# Patient Record
Sex: FEMALE | Race: OTHER | HISPANIC OR LATINO | Employment: UNEMPLOYED | URBAN - METROPOLITAN AREA
[De-identification: names, ages, dates, MRNs, and addresses within clinical notes are randomized per-mention and may not be internally consistent; named-entity substitution may affect disease eponyms.]

---

## 2017-10-02 ENCOUNTER — TRANSCRIBE ORDERS (OUTPATIENT)
Dept: ADMINISTRATIVE | Facility: HOSPITAL | Age: 42
End: 2017-10-02

## 2017-10-02 ENCOUNTER — ALLSCRIPTS OFFICE VISIT (OUTPATIENT)
Dept: OTHER | Facility: OTHER | Age: 42
End: 2017-10-02

## 2017-10-02 DIAGNOSIS — Z13.220 ENCOUNTER FOR SCREENING FOR LIPOID DISORDERS: ICD-10-CM

## 2017-10-02 DIAGNOSIS — Z12.31 ENCOUNTER FOR SCREENING MAMMOGRAM FOR MALIGNANT NEOPLASM OF BREAST: ICD-10-CM

## 2017-10-02 DIAGNOSIS — R10.32 LEFT LOWER QUADRANT PAIN: ICD-10-CM

## 2017-10-02 DIAGNOSIS — E66.9 OBESITY: ICD-10-CM

## 2017-10-02 DIAGNOSIS — I10 ESSENTIAL (PRIMARY) HYPERTENSION: ICD-10-CM

## 2017-10-02 DIAGNOSIS — E55.9 VITAMIN D DEFICIENCY: ICD-10-CM

## 2017-10-03 ENCOUNTER — TRANSCRIBE ORDERS (OUTPATIENT)
Dept: LAB | Facility: HOSPITAL | Age: 42
End: 2017-10-03

## 2017-10-03 ENCOUNTER — GENERIC CONVERSION - ENCOUNTER (OUTPATIENT)
Dept: OTHER | Facility: OTHER | Age: 42
End: 2017-10-03

## 2017-10-03 ENCOUNTER — APPOINTMENT (OUTPATIENT)
Dept: LAB | Facility: HOSPITAL | Age: 42
End: 2017-10-03
Payer: COMMERCIAL

## 2017-10-03 DIAGNOSIS — I10 ESSENTIAL (PRIMARY) HYPERTENSION: ICD-10-CM

## 2017-10-03 DIAGNOSIS — E66.9 OBESITY: ICD-10-CM

## 2017-10-03 DIAGNOSIS — Z13.220 ENCOUNTER FOR SCREENING FOR LIPOID DISORDERS: ICD-10-CM

## 2017-10-03 DIAGNOSIS — E55.9 VITAMIN D DEFICIENCY: ICD-10-CM

## 2017-10-03 LAB
25(OH)D3 SERPL-MCNC: 34.2 NG/ML (ref 30–100)
ALBUMIN SERPL BCP-MCNC: 3.4 G/DL (ref 3.5–5)
ALP SERPL-CCNC: 87 U/L (ref 46–116)
ALT SERPL W P-5'-P-CCNC: 21 U/L (ref 12–78)
ANION GAP SERPL CALCULATED.3IONS-SCNC: 7 MMOL/L (ref 4–13)
AST SERPL W P-5'-P-CCNC: 15 U/L (ref 5–45)
BILIRUB SERPL-MCNC: 0.31 MG/DL (ref 0.2–1)
BUN SERPL-MCNC: 11 MG/DL (ref 5–25)
CALCIUM SERPL-MCNC: 9.3 MG/DL (ref 8.3–10.1)
CHLORIDE SERPL-SCNC: 104 MMOL/L (ref 100–108)
CHOLEST SERPL-MCNC: 194 MG/DL (ref 50–200)
CO2 SERPL-SCNC: 29 MMOL/L (ref 21–32)
CREAT SERPL-MCNC: 0.61 MG/DL (ref 0.6–1.3)
CREAT UR-MCNC: 355 MG/DL
EST. AVERAGE GLUCOSE BLD GHB EST-MCNC: 123 MG/DL
GFR SERPL CREATININE-BSD FRML MDRD: 112 ML/MIN/1.73SQ M
GLUCOSE P FAST SERPL-MCNC: 107 MG/DL (ref 65–99)
HBA1C MFR BLD: 5.9 % (ref 4.2–6.3)
HDLC SERPL-MCNC: 49 MG/DL (ref 40–60)
LDLC SERPL CALC-MCNC: 123 MG/DL (ref 0–100)
MICROALBUMIN UR-MCNC: 14.8 MG/L (ref 0–20)
MICROALBUMIN/CREAT 24H UR: 4 MG/G CREATININE (ref 0–30)
POTASSIUM SERPL-SCNC: 3.5 MMOL/L (ref 3.5–5.3)
PROT SERPL-MCNC: 7.7 G/DL (ref 6.4–8.2)
SODIUM SERPL-SCNC: 140 MMOL/L (ref 136–145)
TRIGL SERPL-MCNC: 112 MG/DL

## 2017-10-03 PROCEDURE — 82043 UR ALBUMIN QUANTITATIVE: CPT

## 2017-10-03 PROCEDURE — 36415 COLL VENOUS BLD VENIPUNCTURE: CPT

## 2017-10-03 PROCEDURE — 83036 HEMOGLOBIN GLYCOSYLATED A1C: CPT

## 2017-10-03 PROCEDURE — 82306 VITAMIN D 25 HYDROXY: CPT

## 2017-10-03 PROCEDURE — 80053 COMPREHEN METABOLIC PANEL: CPT

## 2017-10-03 PROCEDURE — 82570 ASSAY OF URINE CREATININE: CPT

## 2017-10-03 PROCEDURE — 80061 LIPID PANEL: CPT

## 2017-10-03 NOTE — PROGRESS NOTES
Assessment  1  Essential hypertension (401 9) (I10)   2  Obesity (278 00) (E66 9)   3  Encounter for screening mammogram for malignant neoplasm of breast (V76 12)   (Z12 31)   4  Screening for lipoid disorders (V77 91) (Z13 220)   5  Vitamin D deficiency (268 9) (E55 9)   6  Colon cancer screening (V76 51) (Z12 11)    Plan  Colon cancer screening    · COLONOSCOPY; Status:Active; Requested for:2017;   Colon cancer screening, FamHx: Family history of colon cancer    · 1 - Dario VELAZQUEZ, Kishan Gray  (Gastroenterology) Co-Management  *mom and dad have colon  cancer  Status: Active  Requested for: 2017  Care Summary provided  : Yes  Encounter for screening mammogram for malignant neoplasm of breast    · * MAMMO SCREENING BILATERAL W CAD; Status:Hold For - Scheduling; Requested  for:2017;   Essential hypertension    · AmLODIPine Besylate 2 5 MG Oral Tablet; TAKE 1 TABLET DAILY AS DIRECTED   · Lisinopril-Hydrochlorothiazide 20-25 MG Oral Tablet; TAKE 1 TABLET DAILY  Essential hypertension, Obesity, Screening for lipoid disorders, Vitamin D deficiency    · (1) HEMOGLOBIN A1C; Status:Active; Requested GOJ:[id number];   Essential hypertension, Screening for lipoid disorders, Vitamin D deficiency    · (1) COMPREHENSIVE METABOLIC PANEL; Status:Active; Requested [id number];    · (1) LIPID PANEL, FASTING; Status:Active; Requested for:2017;    · (1) MICROALBUMIN CREATININE RATIO, RANDOM URINE; Status:Active; Requested  [id number];    · (1) VITAMIN D 25-HYDROXY; Status:Active; Requested for:2017;   PMH: History of hypokalemia    · Potassium Chloride ER 10 MEQ Oral Capsule Extended Release; TAKE 1  CAPSULE DAILY  PMH: Medial epicondylitis    · Meloxicam 7 5 MG Oral Tablet; TAKE 1 TABLET DAILY AS NEEDED    Discussion/Summary  Possible side effects of new medications were reviewed with the patient/guardian today  The treatment plan was reviewed with the patient/guardian   The patient/guardian understands and agrees with the treatment plan      Chief Complaint  PT here to get refills on her Medication   Patient is here today for follow up of chronic conditions described in HPI  History of Present Illness  The patient is being seen for follow-up of lumbar pain  The patient reports doing well  There are no comorbid illnesses  She has had no significant interval events  The patient is currently asymptomatic  Associated symptoms:  no incontinence-and-no urinary retention  Medications include nonsteroidal anti-inflammatory drugs  Medications:  the patient is adherent to her medication regimen, but-she denies medication side effects  Disease management:  the patient is doing well with her goals  The patient presents for follow-up of primary hypertension  The patient states she has been doing well with her blood pressure control since the last visit  She has no comorbid illnesses  She has no significant interval events  Symptoms: Associated symptoms include no headache,-no focal neurologic deficits-and-no memory loss  Blood pressure control has been good  Medications: the patient is adherent with her medication regimen -She denies medication side effects  Disease Management: the patient is doing well with her blood pressure goals  The patient is due for a lipid panel,-an eye exam,-a serum creatinine-and-a urine microalbumin  Review of Systems    Constitutional: No fever, no chills, feels well, no tiredness, no recent weight gain or weight loss  Eyes: No complaints of eye pain, no red eyes, no eyesight problems, no discharge, no dry eyes, no itching of eyes  ENT: no complaints of earache, no loss of hearing, no nose bleeds, no nasal discharge, no sore throat, no hoarseness  Cardiovascular: No complaints of slow heart rate, no fast heart rate, no chest pain, no palpitations, no leg claudication, no lower extremity edema     Respiratory: No complaints of shortness of breath, no wheezing, no cough, no SOB on exertion, no orthopnea, no PND  Integumentary: No complaints of skin rash or lesions, no itching, no skin wounds, no breast pain or lump  Neurological: No complaints of headache, no confusion, no convulsions, no numbness, no dizziness or fainting, no tingling, no limb weakness, no difficulty walking  Psychiatric: Not suicidal, no sleep disturbance, no anxiety or depression, no change in personality, no emotional problems  Endocrine: No complaints of proptosis, no hot flashes, no muscle weakness, no deepening of the voice, no feelings of weakness  Hematologic/Lymphatic: No complaints of swollen glands, no swollen glands in the neck, does not bleed easily, does not bruise easily  Active Problems  1  Encounter for gynecological examination without abnormal finding (V72 31) (Z01 419)   2  Encounter for screening mammogram for malignant neoplasm of breast (V76 12)   (Z12 31)   3  Essential hypertension (401 9) (I10)   4  Herniated nucleus pulposus, L4-5 (722 10) (M51 26)   5  Lumbar radiculopathy (724 4) (M54 16)   6  Need for prophylactic vaccination and inoculation against influenza (V04 81) (Z23)   7  Obesity (278 00) (E66 9)   8  Ovarian cyst, right (620 2) (N83 201)   9  Pigmented nevus (216 9) (D22 9)   10  Sacroiliitis (720 2) (M46 1)   11  Screening for lipoid disorders (V77 91) (Z13 220)   12  Tuberculosis screening (V74 1) (Z11 1)   13  Vitamin D deficiency (268 9) (E55 9)    Past Medical History  1  History of  6 (V22 2) (Z33 1)   2  History of hypertension (V12 59) (Z86 79)   3  History of migraine (V12 49) (Z86 69)   4  History of spontaneous  (V13 29) (Z87 59)   5  History of spontaneous  (V13 29) (Z87 59)   6  History of Menarche (V21 8)   7  History of Normal vaginal delivery (650) (O80)    The active problems and past medical history were reviewed and updated today  Surgical History  1  History of Appendectomy   2  History of Cholecystectomy   3   History of Diagnostic Cystoscopy   4  History of Endometrial Biopsy By Suction   5  History of Hysterectomy   6  History of Oral Surgery Tooth Extraction   7  History of Salpingectomy   8  History of Surgical Treatment Of Missed  In First Trimester    The surgical history was reviewed and updated today  Family History  Mother    1  Family history of Diabetes Mellitus (V18 0)   2  Family history of colon cancer (V16 0) (Z80 0)   3  Family history of Hypertension (V17 49)  Father    4  Family history of Colon Cancer (V16 0)   5  Family history of colon cancer (V16 0) (Z80 0)   6  Family history of depression (V17 0) (Z81 8)   7  Family history of hypertension (V17 49) (Z82 49)   8  Family history of Heart Disease (V17 49)   9  Family history of Prostate Cancer (V16 42)  Sister    8  Family history of cardiac disorder (V17 49) (Z82 49)   11  Family history of diabetes mellitus (V18 0) (Z83 3)   12  Family history of hypertension (V17 49) (Z82 49)  Maternal Grandmother    15  Family history of Diabetes Mellitus (V18 0)  Maternal Aunt    15  Family history of diabetes mellitus (V18 0) (Z83 3)  Paternal Aunt    13  Family history of malignant neoplasm of breast (V16 3) (Z80 3)  Family History    16  Family history of Epilepsy   17  Family history of Respiratory Disorder    The family history was reviewed and updated today  Social History   · Being A Social Drinker   · Birth Control Method - Partner Had Vasectomy   · Currently sexually active   · Daily Coffee Consumption (1  Cups/Day)   · Denied: History of Drug Use   · Lack of exercise (V69 0) (Z72 3)   · Never A Smoker  The social history was reviewed and updated today  The social history was reviewed and is unchanged  Current Meds   1  AmLODIPine Besylate 2 5 MG Oral Tablet; TAKE 1 TABLET DAILY AS DIRECTED; Therapy: 45OPF1348 to (Evaluate:46Gix9938)  Requested for: 45XSN2298; Last   Rx:17Gfo5850 Ordered   2  EQL Vitamin D3 1000 UNIT TABS;    Therapy: (XIQMJCFQ:73IKK4519) to Recorded   3  Lisinopril-Hydrochlorothiazide 20-25 MG Oral Tablet; TAKE 1 TABLET DAILY; Therapy: 39OXA7716 to (Evaluate:67Zta1823)  Requested for: 98DXJ9385; Last   Rx:47Hmf2477 Ordered   4  Meloxicam 7 5 MG Oral Tablet; TAKE 1 TABLET DAILY AS NEEDED; Therapy: 27Zqu2819 to (Lisa Finn)  Requested for: 94Ivo1016; Last   Rx:99Oxp7124 Ordered   5  Potassium Chloride ER 10 MEQ Oral Capsule Extended Release; TAKE 1 CAPSULE   DAILY; Therapy: 34OPR5344 to (Evaluate:01Qsp0800)  Requested for: 19Jho5824; Last   Rx:06Edx1119 Ordered    The medication list was reviewed and updated today  Allergies  1  No Known Drug Allergies  2  Seasonal    Vitals  Vital Signs    Recorded: 75MLI5449 09:06AM   Heart Rate 68   Respiration 16   Systolic 672   Diastolic 82   Weight 220 lb 2 oz   BMI Calculated 32 74   BSA Calculated 1 8     Physical Exam    Constitutional   General appearance: No acute distress, well appearing and well nourished  Eyes   Conjunctiva and lids: No swelling, erythema or discharge  Pupils and irises: Equal, round and reactive to light  Pulmonary   Respiratory effort: No increased work of breathing or signs of respiratory distress  Auscultation of lungs: Clear to auscultation  Cardiovascular   Palpation of heart: Normal PMI, no thrills  Auscultation of heart: Normal rate and rhythm, normal S1 and S2, without murmurs  Examination of extremities for edema and/or varicosities: Normal     Lymphatic   Palpation of lymph nodes in neck: No lymphadenopathy  Neurologic   Cranial nerves: Cranial nerves 2-12 intact  Reflexes: 2+ and symmetric  Sensation: No sensory loss      Psychiatric   Orientation to person, place, and time: Normal     Mood and affect: Normal          Results/Data  PHQ-2 Adult Depression Screening 07WWL1978 09:10AM User, s     Test Name Result Flag Reference   PHQ-2 Adult Depression Score 0     Over the last two weeks, how often have you been bothered by any of the following problems?   Little interest or pleasure in doing things: Not at all - 0  Feeling down, depressed, or hopeless: Not at all - 0   PHQ-2 Adult Depression Screening Negative         Signatures   Electronically signed by : Naty Hernandez MD; Oct  2 2017  9:27AM EST                       (Author)

## 2017-10-04 ENCOUNTER — ALLSCRIPTS OFFICE VISIT (OUTPATIENT)
Dept: OTHER | Facility: OTHER | Age: 42
End: 2017-10-04

## 2017-10-06 ENCOUNTER — GENERIC CONVERSION - ENCOUNTER (OUTPATIENT)
Dept: OTHER | Facility: OTHER | Age: 42
End: 2017-10-06

## 2017-10-06 ENCOUNTER — LAB REQUISITION (OUTPATIENT)
Dept: LAB | Facility: HOSPITAL | Age: 42
End: 2017-10-06
Payer: COMMERCIAL

## 2017-10-06 ENCOUNTER — HOSPITAL ENCOUNTER (OUTPATIENT)
Dept: MAMMOGRAPHY | Facility: HOSPITAL | Age: 42
Discharge: HOME | End: 2017-10-06
Payer: COMMERCIAL

## 2017-10-06 DIAGNOSIS — Z12.31 ENCOUNTER FOR SCREENING MAMMOGRAM FOR MALIGNANT NEOPLASM OF BREAST: ICD-10-CM

## 2017-10-06 DIAGNOSIS — R82.90 ABNORMAL FINDING IN URINE: ICD-10-CM

## 2017-10-06 PROCEDURE — 87186 SC STD MICRODIL/AGAR DIL: CPT | Performed by: NURSE PRACTITIONER

## 2017-10-06 PROCEDURE — G0202 SCR MAMMO BI INCL CAD: HCPCS

## 2017-10-06 PROCEDURE — 87086 URINE CULTURE/COLONY COUNT: CPT | Performed by: NURSE PRACTITIONER

## 2017-10-06 PROCEDURE — 87147 CULTURE TYPE IMMUNOLOGIC: CPT | Performed by: NURSE PRACTITIONER

## 2017-10-06 PROCEDURE — 87077 CULTURE AEROBIC IDENTIFY: CPT | Performed by: NURSE PRACTITIONER

## 2017-10-11 ENCOUNTER — GENERIC CONVERSION - ENCOUNTER (OUTPATIENT)
Dept: OTHER | Facility: OTHER | Age: 42
End: 2017-10-11

## 2017-10-11 ENCOUNTER — HOSPITAL ENCOUNTER (OUTPATIENT)
Dept: RADIOLOGY | Facility: HOSPITAL | Age: 42
Discharge: HOME | End: 2017-10-11
Payer: COMMERCIAL

## 2017-10-11 DIAGNOSIS — R10.32 LEFT LOWER QUADRANT PAIN: ICD-10-CM

## 2017-10-11 LAB — BACTERIA UR CULT: ABNORMAL

## 2017-10-11 PROCEDURE — 76830 TRANSVAGINAL US NON-OB: CPT

## 2017-10-11 PROCEDURE — 76856 US EXAM PELVIC COMPLETE: CPT

## 2017-10-13 ENCOUNTER — ANESTHESIA EVENT (OUTPATIENT)
Dept: PERIOP | Facility: AMBULARY SURGERY CENTER | Age: 42
End: 2017-10-13
Payer: COMMERCIAL

## 2017-10-16 ENCOUNTER — GENERIC CONVERSION - ENCOUNTER (OUTPATIENT)
Dept: OTHER | Facility: OTHER | Age: 42
End: 2017-10-16

## 2017-10-23 ENCOUNTER — ANESTHESIA (OUTPATIENT)
Dept: PERIOP | Facility: AMBULARY SURGERY CENTER | Age: 42
End: 2017-10-23
Payer: COMMERCIAL

## 2017-10-23 ENCOUNTER — GENERIC CONVERSION - ENCOUNTER (OUTPATIENT)
Dept: GASTROENTEROLOGY | Facility: CLINIC | Age: 42
End: 2017-10-23

## 2017-10-23 ENCOUNTER — HOSPITAL ENCOUNTER (OUTPATIENT)
Facility: AMBULARY SURGERY CENTER | Age: 42
Setting detail: OUTPATIENT SURGERY
Discharge: HOME | End: 2017-10-23
Attending: INTERNAL MEDICINE | Admitting: INTERNAL MEDICINE
Payer: COMMERCIAL

## 2017-10-23 VITALS
SYSTOLIC BLOOD PRESSURE: 112 MMHG | BODY MASS INDEX: 33.04 KG/M2 | OXYGEN SATURATION: 99 % | WEIGHT: 175 LBS | HEART RATE: 55 BPM | HEIGHT: 61 IN | DIASTOLIC BLOOD PRESSURE: 72 MMHG | TEMPERATURE: 97.1 F | RESPIRATION RATE: 18 BRPM

## 2017-10-23 RX ORDER — AMLODIPINE BESYLATE 2.5 MG/1
5 TABLET ORAL DAILY
COMMUNITY
End: 2018-10-09 | Stop reason: SDUPTHER

## 2017-10-23 RX ORDER — SODIUM CHLORIDE, SODIUM LACTATE, POTASSIUM CHLORIDE, CALCIUM CHLORIDE 600; 310; 30; 20 MG/100ML; MG/100ML; MG/100ML; MG/100ML
125 INJECTION, SOLUTION INTRAVENOUS CONTINUOUS
Status: DISCONTINUED | OUTPATIENT
Start: 2017-10-23 | End: 2017-10-23 | Stop reason: HOSPADM

## 2017-10-23 RX ORDER — POTASSIUM CHLORIDE 750 MG/1
10 TABLET, FILM COATED, EXTENDED RELEASE ORAL 2 TIMES DAILY
COMMUNITY

## 2017-10-23 RX ORDER — MELATONIN
1000 DAILY
COMMUNITY

## 2017-10-23 RX ORDER — PROPOFOL 10 MG/ML
INJECTION, EMULSION INTRAVENOUS AS NEEDED
Status: DISCONTINUED | OUTPATIENT
Start: 2017-10-23 | End: 2017-10-23 | Stop reason: SURG

## 2017-10-23 RX ORDER — LISINOPRIL AND HYDROCHLOROTHIAZIDE 25; 20 MG/1; MG/1
1 TABLET ORAL DAILY
COMMUNITY
End: 2018-10-09 | Stop reason: SDUPTHER

## 2017-10-23 RX ADMIN — PROPOFOL 50 MG: 10 INJECTION, EMULSION INTRAVENOUS at 08:54

## 2017-10-23 RX ADMIN — PROPOFOL 50 MG: 10 INJECTION, EMULSION INTRAVENOUS at 08:45

## 2017-10-23 RX ADMIN — PROPOFOL 100 MG: 10 INJECTION, EMULSION INTRAVENOUS at 08:42

## 2017-10-23 RX ADMIN — SODIUM CHLORIDE, POTASSIUM CHLORIDE, SODIUM LACTATE AND CALCIUM CHLORIDE 125 ML/HR: 600; 310; 30; 20 INJECTION, SOLUTION INTRAVENOUS at 07:54

## 2017-10-23 RX ADMIN — PROPOFOL 50 MG: 10 INJECTION, EMULSION INTRAVENOUS at 08:50

## 2017-10-23 RX ADMIN — PROPOFOL 20 MG: 10 INJECTION, EMULSION INTRAVENOUS at 08:58

## 2017-10-23 NOTE — OP NOTE
OPERATIVE REPORT  PATIENT NAME: Anthony Vora    :  1975  MRN: [id number]  Pt Location: [facility]  GI ROOM 01    SURGERY DATE: 10/23/2017    Surgeon(s) and Role:     * Anna Ley DO - Primary    Preop Diagnosis:  Encounter for screening for malignant neoplasm of colon [Z12 11]    Post-Op Diagnosis Codes:     * Encounter for screening for malignant neoplasm of colon [Z12 11]    Procedure(s) (LRB):  COLONOSCOPY (N/A)    Specimen(s):  * No specimens in log *    Estimated Blood Loss:   Minimal    Drains:       Anesthesia Type:   IV Sedation with Anesthesia    Operative Indications:  Encounter for screening for malignant neoplasm of colon [Z12 11]      Operative Findings:    Colonoscopy Procedure Note    Procedure: Colonoscopy    Sedation: Monitored anesthesia care, check anesthesia records      ASA Class: 2    INDICATIONS: family history of colon cancer    POST-OP DIAGNOSIS: See the impression below    Procedure Details     Informed consent was obtained for the procedure, including sedation  Risks of perforation, hemorrhage, adverse drug reaction and aspiration were discussed  The patient was placed in the left lateral decubitus position  Based on the pre-procedure assessment, including review of the patient's medical history, medications, allergies, and review of systems, she had been deemed to be an appropriate candidate for conscious sedation; she was therefore sedated with the medications listed below  The patient was monitored continuously with telemetry, pulse oximetry, blood pressure monitoring, and direct observations  A rectal examination was performed  The colonoscope was inserted into the rectum and advanced under direct vision to the cecum, which was identified by the ileocecal valve and appendiceal orifice  The quality of the colonic preparation was poor    A careful inspection was made as the colonoscope was withdrawn, including a retroflexed view of the rectum; findings and interventions are described below  Findings:  Large amount of liquid stool left in the colon  This was aspirated with inadequate views  Complications:  None; patient tolerated the procedure well  Impression:    Poor overall prep  Recommendations:  I would recommend repeating the colonoscopy in 3 years due to poor prep and family history of colon cancer          Alexa Liriano DO  DATE: October 23, 2017  TIME: 9:00 AM

## 2017-10-23 NOTE — ANESTHESIA PREPROCEDURE EVALUATION
Review of Systems/Medical History  Patient summary reviewed    History of anesthetic complications PONV    Cardiovascular  Hypertension ,    Pulmonary  Negative pulmonary ROS ,        GI/Hepatic            Endo/Other     GYN       Hematology   Musculoskeletal       Neurology   Psychology           Physical Exam    Airway    Mallampati score: II  TM Distance: >3 FB  Neck ROM: full     Dental       Cardiovascular      Pulmonary      Other Findings        Anesthesia Plan  ASA Score- 2       Anesthesia Type- IV sedation with anesthesia with ASA Monitors  Additional Monitors:   Airway Plan:           Induction- intravenous  Informed Consent- Anesthetic plan and risks discussed with patient  I personally reviewed this patient with the CRNA  Discussed and agreed on the Anesthesia Plan with the CRNA  Rae Urrutia

## 2017-10-23 NOTE — ANESTHESIA POSTPROCEDURE EVALUATION
Post-Op Assessment Note      CV Status:  Stable    Mental Status:  Alert and awake    Hydration Status:  Euvolemic    PONV Controlled:  Controlled    Airway Patency:  Patent    Post Op Vitals Reviewed: Yes          Staff: CRNA           /71 (10/23/17 0900)    Temp     Pulse 68 (10/23/17 0900)   Resp 14 (10/23/17 0900)    SpO2 98 % (10/23/17 0900)

## 2017-10-27 NOTE — CONSULTS
Assessment  1  Family history of colon cancer (V16 0) (Z80 0) : Father, Mother, Brother   2  High risk for colon cancer (V49 89) (Z91 89)    Plan  Colon cancer screening    · Prepopik 10-3 5-12 MG-GM-GM Oral Packet; DILUTE CONTENTS OF PACKET AND  USE AS DIRECTED FOR BOWEL PREP   Rx By: Jose Mcclain; Dispense: 1 Days ; #:1 X 2 Packet Box; Refill: 0;For: Colon cancer screening; WARD = N; Verified Transmission to [id number] Last Updated By: System, [employer]; 10/4/2017 8:13:14 AM   · Follow-up PRN Evaluation and Treatment  Follow-up  Status: Complete  Done:  02AOB4487   Ordered; For: Colon cancer screening; Ordered By: Jose Mcclain Performed:  Due: 85RWU2258   · COLONOSCOPY (GI, SURG); Status:Active; Requested [id number];    Perform:[facility]; SQZ:[id number]; Last Updated Richy Alejo; 10/4/2017 8:30:23 AM;Ordered; For:Colon cancer screening; Ordered By:Andrae Tillman; Discussion/Summary  Discussion Summary:   43year old female with strong family history of Colon cancer  her genetic counseling but pt refused  do colonoscopy due to increased risk of colon cancer and then I would recommend she get this every 5 years  Chief Complaint  Chief Complaint Free Text Note Form: pt consult for colon cancer screening, referred by Dr Nargis Gage      History of Present Illness  HPI: 43year old female referred by Dr Steff Lin due to strong family history of cancer    had colon and prostate cancer diagnosed in his late 42's and  at age 49 was recently diagnosed with colon cancer in her late 59's had colon cancer diagnosed in his 52's and  at age 72 unrelated to the colon cancer  Reports dad was a smoker  colonoscopy in her past in her 29's which is reported to me as normal    denies any hematochezia  Review of Systems  Complete-Female GI Adult:   Constitutional: No fever, no chills, feels well, no tiredness, no recent weight gain or weight loss     Eyes: No complaints of eye pain, no red eyes, no eyesight problems, no discharge, no dry eyes, no itching of eyes  ENT: no complaints of earache, no loss of hearing, no nose bleeds, no nasal discharge, no sore throat, no hoarseness  Cardiovascular: No complaints of slow heart rate, no fast heart rate, no chest pain, no palpitations, no leg claudication, no lower extremity edema  Respiratory: No complaints of shortness of breath, no wheezing, no cough, no SOB on exertion, no orthopnea, no PND  Gastrointestinal: no abdominal pain,-- no nausea,-- no vomiting,-- no constipation,-- no diarrhea-- and-- no blood in stools  Genitourinary: No complaints of dysuria, no incontinence, no pelvic pain, no dysmenorrhea, no vaginal discharge or bleeding  Musculoskeletal: No complaints of arthralgias, no myalgias, no joint swelling or stiffness, no limb pain or swelling  Integumentary: No complaints of skin rash or lesions, no itching, no skin wounds, no breast pain or lump  Neurological: No complaints of headache, no confusion, no convulsions, no numbness, no dizziness or fainting, no tingling, no limb weakness, no difficulty walking  Psychiatric: Not suicidal, no sleep disturbance, no anxiety or depression, no change in personality, no emotional problems  Endocrine: No complaints of proptosis, no hot flashes, no muscle weakness, no deepening of the voice, no feelings of weakness  Hematologic/Lymphatic: No complaints of swollen glands, no swollen glands in the neck, does not bleed easily, does not bruise easily  ROS Reviewed:   ROS reviewed  Active Problems  1  Colon cancer screening (V76 51) (Z12 11)   2  Essential hypertension (401 9) (I10)   3  Herniated nucleus pulposus, L4-5 (722 10) (M51 26)   4  Lumbar radiculopathy (724 4) (M54 16)   5  Need for prophylactic vaccination and inoculation against influenza (V04 81) (Z23)   6  Obesity (278 00) (E66 9)   7  Ovarian cyst, right (620 2) (N83 201)   8   Pigmented nevus (216 9) (D22 9)   9  Sacroiliitis (720 2) (M46 1)   10  Vitamin D deficiency (268 9) (E55 9)    Past Medical History  1  History of Encounter for gynecological examination without abnormal finding (V72 31)   (Z01 419)   2  History of  6 (V22 2) (Z33 1)   3  History of hypertension (V12 59) (Z86 79)   4  History of migraine (V12 49) (Z86 69)   5  History of screening mammography (V15 89) (Z92 89)   6  History of spontaneous  (V13 29) (Z87 59)   7  History of spontaneous  (V13 29) (Z87 59)   8  History of Menarche (V21 8)   9  History of Normal vaginal delivery (650) (O80)   10  History of Screening for lipoid disorders (V77 91) (Z13 220)   11  History of Tuberculosis screening (V74 1) (Z11 1)  Active Problems And Past Medical History Reviewed: The active problems and past medical history were reviewed and updated today  Surgical History  1  History of Appendectomy   2  History of Cholecystectomy   3  History of Diagnostic Cystoscopy   4  History of Endometrial Biopsy By Suction   5  History of Hysterectomy   6  History of Oral Surgery Tooth Extraction   7  History of Salpingectomy   8  History of Surgical Treatment Of Missed  In First Trimester  Surgical History Reviewed: The surgical history was reviewed and updated today  Family History  Mother    1  Family history of Diabetes Mellitus (V18 0)   2  Family history of colon cancer (V16 0) (Z80 0)   3  Family history of Hypertension (V17 49)  Father    4  Family history of Colon Cancer (V16 0)   5  Family history of colon cancer (V16 0) (Z80 0)   6  Family history of depression (V17 0) (Z81 8)   7  Family history of hypertension (V17 49) (Z82 49)   8  Family history of Heart Disease (V17 49)   9  Family history of Prostate Cancer (V16 42)  Sister    8  Family history of cardiac disorder (V17 49) (Z82 49)   11  Family history of diabetes mellitus (V18 0) (Z83 3)   12   Family history of hypertension (V17 49) (Z82 49)  Brother 15  Family history of colon cancer (V16 0) (Z80 0)  Maternal Grandmother    15  Family history of Diabetes Mellitus (V18 0)  Maternal Aunt    13  Family history of diabetes mellitus (V18 0) (Z83 3)  Paternal Aunt    12  Family history of malignant neoplasm of breast (V16 3) (Z80 3)  Family History    17  Family history of Epilepsy   18  Family history of Respiratory Disorder  Family History Reviewed: The family history was reviewed and updated today  Social History   · Being A Social Drinker   · Birth Control Method - Partner Had Vasectomy   · Currently sexually active   · Daily Coffee Consumption (1  Cups/Day)   · Denied: History of Drug Use   · Lack of exercise (V69 0) (Z72 3)   · Never A Smoker  Social History Reviewed: The social history was reviewed and updated today  Current Meds   1  AmLODIPine Besylate 2 5 MG Oral Tablet; TAKE 1 TABLET DAILY AS DIRECTED; Therapy: 45VSF8309 to (Evaluate:60Ghk6814)  Requested for: 79AIT3614; Last   Rx:02Oct2017 Ordered   2  EQL Vitamin D3 1000 UNIT TABS; Therapy: (Recorded:24Oct2013) to Recorded   3  Lisinopril-Hydrochlorothiazide 20-25 MG Oral Tablet; TAKE 1 TABLET DAILY; Therapy: 64CTL6655 to (Evaluate:67Zry3443)  Requested for: 02Oct2017; Last   Rx:02Oct2017 Ordered   4  Meloxicam 7 5 MG Oral Tablet; TAKE 1 TABLET DAILY AS NEEDED; Therapy: 68Ddg3988 to (Evaluate:01Nov2017)  Requested for: 02Oct2017; Last   Rx:38Vfy1985 Ordered   5  Potassium Chloride ER 10 MEQ Oral Capsule Extended Release; TAKE 1 CAPSULE   DAILY; Therapy: 58BHW7132 to (Angélica Brooke)  Requested for: 30MLD7090; Last   Rx:02Oct2017 Ordered  Medication List Reviewed: The medication list was reviewed and updated today  Allergies  1  No Known Drug Allergies  2   Seasonal    Vitals  Vital Signs    Recorded: 04JFY5926 07:43AM   Temperature 97 7 F, Oral   Heart Rate 66   Systolic 603, LUE, Sitting   Diastolic 72, LUE, Sitting   Height 5 ft 1 5 in   Weight 177 lb 4 0 oz   BMI Calculated 32 95   BSA Calculated 1 81   O2 Saturation 97, RA     Physical Exam    Constitutional   General appearance: No acute distress, well appearing and well nourished  Eyes   Conjunctiva and lids: No swelling, erythema or discharge  Pupils and irises: Equal, round and reactive to light  Ears, Nose, Mouth, and Throat   External inspection of ears and nose: Normal     Nasal mucosa, septum, and turbinates: Normal without edema or erythema  Oropharynx: Normal with no erythema, edema, exudate or lesions  Pulmonary   Respiratory effort: No increased work of breathing or signs of respiratory distress  Auscultation of lungs: Clear to auscultation  Cardiovascular   Auscultation of heart: Normal rate and rhythm, normal S1 and S2, without murmurs  Examination of extremities for edema and/or varicosities: Normal     Abdomen   Abdomen: Non-tender, no masses  Liver and spleen: No hepatomegaly or splenomegaly  Lymphatic   Palpation of lymph nodes in neck: No lymphadenopathy  Musculoskeletal   Gait and station: Normal     Digits and nails: Normal without clubbing or cyanosis  Inspection/palpation of joints, bones, and muscles: Normal     Skin   Skin and subcutaneous tissue: Normal without rashes or lesions      Psychiatric   Orientation to person, place, and time: Normal     Mood and affect: Normal          Future Appointments    Date/Time Provider Specialty Site   10/06/2017 09:40 AM JESSE Alcantar Obstetrics/Gynecology [facility]     Signatures   Electronically signed by : Anna Ley DO; Oct  4 2017  9:32AM EST                       (Author)

## 2017-11-27 DIAGNOSIS — N83.201 CYST OF RIGHT OVARY: ICD-10-CM

## 2018-01-11 NOTE — MISCELLANEOUS
Message  "[address]" from [place] [address] Radiology department called today  MA completed new ultrasound order for US Complete Transabdominal and Transvaginal in [employer] for Pt as per Doreen's verbal request via phone call  MA faxed new [address] requisition to Radiology, cancelled previous US complete order in [employer]  Active Problems    1  Chronic constipation (564 00) (K59 09)   2  Colon cancer screening (V76 51) (Z12 11)   3  Encounter for gynecological examination without abnormal finding (V72 31) (Z01 419)   4  Encounter for screening mammogram for malignant neoplasm of breast (V76 12)   (Z12 31)   5  Essential hypertension (401 9) (I10)   6  External hemorrhoid (455 3) (K64 4)   7  Herniated nucleus pulposus, L4-5 (722 10) (M51 26)   8  High risk for colon cancer (V49 89) (Z91 89)   9  Left lower quadrant pain (789 04) (R10 32)   10  Lumbar radiculopathy (724 4) (M54 16)   11  Malodorous urine (791 9) (R82 90)   12  Need for prophylactic vaccination and inoculation against influenza (V04 81) (Z23)   13  Obesity (278 00) (E66 9)   14  Ovarian cyst, right (620 2) (N83 201)   15  Pigmented nevus (216 9) (D22 9)   16  Sacroiliitis (720 2) (M46 1)   17  Vaginal burning (625 8) (N94 9)   18  Vitamin D deficiency (268 9) (E55 9)    Current Meds   1  AmLODIPine Besylate 2 5 MG Oral Tablet; TAKE 1 TABLET DAILY AS DIRECTED; Therapy: 61LNV7456 to (Evaluate:41Qfv2612)  Requested for: 78XPG9284; Last   Rx:02Oct2017 Ordered   2  EQL Vitamin D3 1000 UNIT TABS; Therapy: (Recorded:24Oct2013) to Recorded   3  Lisinopril-Hydrochlorothiazide 20-25 MG Oral Tablet; TAKE 1 TABLET DAILY; Therapy: 54QFT0731 to (Evaluate:31Etv7541)  Requested for: 02Oct2017; Last   Rx:02Oct2017 Ordered   4  Meloxicam 7 5 MG Oral Tablet; TAKE 1 TABLET DAILY AS NEEDED; Therapy: 00Tae8743 to (Evaluate:01Nov2017)  Requested for: 02Oct2017; Last   Rx:02Oct2017 Ordered   5   Potassium Chloride ER 10 MEQ Oral Capsule Extended Release; TAKE 1 CAPSULE DAILY; Therapy: 01AZE6916 to (Abhijit Sorenson)  Requested for: 02Oct2017; Last   Rx:02Oct2017 Ordered   6  Prepopik 10-3 5-12 MG-GM-GM Oral Packet; DILUTE CONTENTS OF PACKET AND USE   AS DIRECTED FOR BOWEL PREP; Therapy: 51LQY3329 to [phone] 16 653)  Requested for: 35JXM8454; Last   Rx:04Oct2017 Ordered    Allergies    1  No Known Drug Allergies    2  Seasonal    Plan  Left lower quadrant pain    · * US PELVIS COMPLETE (TRANSABDOMINAL AND TRANSVAGINAL); Status:Hold For -  Scheduling,Retrospective By Protocol Authorization;  Requested for:11Oct2017;     Signatures   Electronically signed by : Na Bhakta MA; Oct 11 2017 11:37AM EST                       (Author)

## 2018-01-11 NOTE — MISCELLANEOUS
Message   Recorded as Task   Date: 10/11/2017 05:35 PM, Created By: Yesi Waldron   Task Name: Result Follow Up   Assigned To: Obi Hoffman   Regarding Patient: Lois Cuenca, Status: In Progress   Pedrosamantha Baez - 11 Oct 2017 5:35 PM     TASK CREATED  low counts of bacteria on urine culture   the patient reported c/o malodorous urine   susceptible to macrobid - I would advise a course of this and will eRx   please recommend pushing fluids and completing the entire rx     Thanks   Melany Chavira - 11 Oct 2017 5:40 PM     TASK IN PROGRESS   Melany Chavira - 11 Oct 2017 5:42 PM     TASK EDITED  [facility] for pt to cb for results       ext: Jeanette Angela - 11 Oct 2017 6:53 PM     TASK EDITED  MA spoke with Pt today via phone call  MA informed Pt that Pt's recent urine culture was positive for UTI  MA further informed Pt that JESSE Alva prescribed Macrobid 1 capsule twice daily PO for 7 days 0 refills, prescription forwarded to pharmacy in Pt's EHR  MA instructed Pt to drink plenty of liquids  and complete entire antibiotic medication regimen as per [facility] directive  Pt verbalized understanding of MA's instructions  MA reiterated to Pt that if her symptoms worsen and/ she has any questions/concerns, to contact office  Active Problems    1  Bacteriuria (791 9) (R82 71)   2  Chronic constipation (564 00) (K59 09)   3  Colon cancer screening (V76 51) (Z12 11)   4  Encounter for gynecological examination without abnormal finding (V72 31) (Z01 419)   5  Encounter for screening mammogram for malignant neoplasm of breast (V76 12)   (Z12 31)   6  Essential hypertension (401 9) (I10)   7  External hemorrhoid (455 3) (K64 4)   8  Herniated nucleus pulposus, L4-5 (722 10) (M51 26)   9  High risk for colon cancer (V49 89) (Z91 89)   10  Left lower quadrant pain (789 04) (R10 32)   11  Lumbar radiculopathy (724 4) (M54 16)   12  Malodorous urine (791 9) (R82 90)   13   Need for prophylactic vaccination and inoculation against influenza (V04 81) (Z23)   14  Obesity (278 00) (E66 9)   15  Ovarian cyst, right (620 2) (N83 201)   16  Pigmented nevus (216 9) (D22 9)   17  Sacroiliitis (720 2) (M46 1)   18  Vaginal burning (625 8) (N94 9)   19  Vitamin D deficiency (268 9) (E55 9)    Current Meds   1  AmLODIPine Besylate 2 5 MG Oral Tablet; TAKE 1 TABLET DAILY AS DIRECTED; Therapy: 25AJV2120 to (Evaluate:59Wkw6227)  Requested for: 63PVM7806; Last   Rx:02Oct2017 Ordered   2  EQL Vitamin D3 1000 UNIT TABS; Therapy: (Recorded:07Ohg7422) to Recorded   3  Lisinopril-Hydrochlorothiazide 20-25 MG Oral Tablet; TAKE 1 TABLET DAILY; Therapy: 09BXU9025 to (Evaluate:60Nex6025)  Requested for: 02Oct2017; Last   Rx:77Cqk5145 Ordered   4  Meloxicam 7 5 MG Oral Tablet; TAKE 1 TABLET DAILY AS NEEDED; Therapy: 17Xwn9132 to (Evaluate:01Nov2017)  Requested for: 02Oct2017; Last   Rx:58Dey8867 Ordered   5  Nitrofurantoin Monohyd Macro 100 MG Oral Capsule (Macrobid); Take 1 capsule twice   daily; Therapy: 43KYE8085 to (Evaluate:18Oct2017)  Requested for: 35ATU8785; Last   Rx:86Unj0060 Ordered   6  Potassium Chloride ER 10 MEQ Oral Capsule Extended Release; TAKE 1 CAPSULE   DAILY; Therapy: 32YAM1502 to (Jarome Duel)  Requested for: 02Oct2017; Last   Rx:02Oct2017 Ordered   7  Prepopik 10-3 5-12 MG-GM-GM Oral Packet; DILUTE CONTENTS OF PACKET AND USE   AS DIRECTED FOR BOWEL PREP; Therapy: 03XCC1165 to 21 )  Requested for: 69XCD1238; Last   Rx:04Oct2017 Ordered    Allergies    1  No Known Drug Allergies    2   Seasonal    Signatures   Electronically signed by : Haydee Johnston MA; Oct 11 2017  6:53PM EST                       (Author)

## 2018-01-12 VITALS
WEIGHT: 177.25 LBS | BODY MASS INDEX: 32.62 KG/M2 | OXYGEN SATURATION: 97 % | HEIGHT: 62 IN | TEMPERATURE: 97.7 F | HEART RATE: 66 BPM | SYSTOLIC BLOOD PRESSURE: 124 MMHG | DIASTOLIC BLOOD PRESSURE: 72 MMHG

## 2018-01-12 NOTE — RESULT NOTES
Verified Results  (1) LIPID PANEL, FASTING 97Cbh0783 08:47AM Diann Viramontes Marker Order Number: [id number]     Test Name Result Flag Reference   CHOLESTEROL 170 mg/dL     HDL,DIRECT 38 mg/dL L 40-60   Specimen collection should occur prior to Metamizole administration due to the potential for falsely depressed results  LDL CHOLESTEROL CALCULATED 107 mg/dL H 0-100   Triglyceride:         Normal              <150 mg/dl       Borderline High    150-199 mg/dl       High               200-499 mg/dl       Very High          >499 mg/dl  Cholesterol:         Desirable        <200 mg/dl      Borderline High  200-239 mg/dl      High             >239 mg/dl  HDL Cholesterol:        High    >59 mg/dL      Low     <41 mg/dL  LDL CALCULATED:    This screening LDL is a calculated result  It does not have the accuracy of the Direct Measured LDL in the monitoring of patients with hyperlipidemia and/or statin therapy  Direct Measure LDL (EKG816) must be ordered separately in these patients  TRIGLYCERIDES 126 mg/dL  <=150   Specimen collection should occur prior to N-Acetylcysteine or Metamizole administration due to the potential for falsely depressed results  (1) COMPREHENSIVE METABOLIC PANEL 50QCL4700 15:93MX Diann Viramontes Marker Order Number: [id number]     Test Name Result Flag Reference   GLUCOSE,RANDM 105 mg/dL     If the patient is fasting, the ADA then defines impaired fasting glucose as > 100 mg/dL and diabetes as > or equal to 123 mg/dL     SODIUM 139 mmol/L  136-145   POTASSIUM 3 4 mmol/L L 3 5-5 3   CHLORIDE 106 mmol/L  100-108   CARBON DIOXIDE 27 mmol/L  21-32   ANION GAP (CALC) 6 mmol/L  4-13   BLOOD UREA NITROGEN 14 mg/dL  5-25   CREATININE 0 69 mg/dL  0 60-1 30   Standardized to IDMS reference method   CALCIUM 8 5 mg/dL  8 3-10 1   BILI, TOTAL 0 35 mg/dL  0 20-1 00   ALK PHOSPHATAS 74 U/L     ALT (SGPT) 19 U/L  12-78   AST(SGOT) 12 U/L  5-45   ALBUMIN 3 4 g/dL L 3 5-5 0 TOTAL PROTEIN 7 3 g/dL  6 4-8 2   eGFR Non-African American      >60 0 ml/min/1 73sq m   [place] [facility] Disease Education Program recommendations are as follows:  GFR calculation is accurate only with a steady state creatinine  Chronic Kidney disease less than 60 ml/min/1 73 sq  meters  Kidney failure less than 15 ml/min/1 73 sq  meters  (1) MICROALBUMIN CREATININE RATIO, RANDOM URINE 17Mbl9253 08:47AM Dena Viramontes Order Number: [id number]     Test Name Result Flag Reference   MICROALBUMIN/ CREAT R 14 mg/g creatinine  0-30   MICROALBUMIN,URINE 39 0 mg/L H 0 0-20 0   CREATININE URINE 272 0 mg/dL         Plan  Hypokalemia    · Potassium Chloride ER 10 MEQ Oral Capsule Extended Release; TAKE 1  CAPSULE DAILY    Discussion/Summary   low good cholesterol HDL   eat more fibers in your diet and increase physical activity   slightly low potassium   will send potassium pills to the pharmacy        Signatures   Electronically signed by : Randy Riggins MD; Jul 29 2016 10:48AM EST                       (Author)

## 2018-01-12 NOTE — RESULT NOTES
Discussion/Summary   Normal Mammogram   repeat in 1 year   - Dr Ellie Nowak 83PVS3861 08:06AM Frantz Viramontes Samples Order Number: [id number]    - Patient Instructions: To schedule this appointment, please contact Central Scheduling at [phone]  Do not wear any perfume, powder, lotion or deodorant on breast or underarm area  Please bring your doctors order, referral (if needed) and insurance information with you on the day of the test  Failure to bring this information may result in this test being rescheduled  Arrive 15 minutes prior to your appointment time to register  On the day of your test, please bring any prior mammogram or breast studies with you that were not performed at a [facility]  Failure to bring prior exams may result in your test needing to be rescheduled  Test Name Result Flag Reference   MAMMO SCREENING BILATERAL W CAD (Report)     Patient History:   Family history of breast cancer in paternal aunt, unknown cancer    in paternal aunt, prostate cancer at age 50 and colorectal cancer   at age 54 in father, prostate cancer at age 50 in brother,    premenopausal endometrial cancer at age 21, premenopausal ovarian   cancer at age 21, and premenopausal breast cancer at age 28 in    paternal cousin, colorectal cancer at age 79 in mother  Patient has never smoked  Patient's BMI is 32 5  Reason for exam: screening, asymptomatic  Mammo Screening Bilateral W CAD: October 6, 2017 - Check In #:    [de-identified]   Bilateral CC and MLO view(s) were taken  Technologist: MICHAEL Guzman (R)(M)   Prior study comparison: August 2, 2016, mammo screening bilateral   W CAD performed at [facility]  September 27, 2010, bilateral WB digitl bilat cynthia, performed at    [address] [place] [address]  The breast tissue is almost entirely fat       No dominant soft tissue mass or suspicious calcifications are    noted  The skin and nipple contours are within normal limits  No mammographic evidence of malignancy  No significant changes when compared with prior studies  ACR BI-RADSï¾® Assessments: BiRad:1 - Negative     Recommendation:   Routine screening mammogram of both breasts in 1 year  Analyzed by CAD     The patient is scheduled in a reminder system for screening    mammography  8-10% of cancers will be missed on mammography  Management of a    palpable abnormality must be based on clinical grounds  Patients   will be notified of their results via letter from our facility  Accredited by [employer] of Radiology and FDA  Transcription Location: MICHAEL Kraft 98: [id number]     Risk Value(s):   Tyrer-Cuzick 10 Year: 1 800%, Tyrer-Cuzick Lifetime: 12 000%,    Myriad Table: 7 2%, DEBRA 5 Year: 0 5%, NCI Lifetime: 7 9%, MRS    : Based on personal and/or family history,    consideration of hereditary risk assessment may be warranted     Signed by:   Kasia Claros MD   10/6/17       Signatures   Electronically signed by : Singh Ardon MD; Oct  6 2017 12:51PM EST                       (Author)

## 2018-01-13 NOTE — MISCELLANEOUS
3  Lisinopril-Hydrochlorothiazide 20-25 MG Oral Tablet; TAKE 1 TABLET DAILY; Therapy: 73YKF2848 to (Evaluate:88Zfx6501)  Requested for: 02Oct2017; Last   Rx:02Oct2017 Ordered   4  Meloxicam 7 5 MG Oral Tablet; TAKE 1 TABLET DAILY AS NEEDED; Therapy: 32Cpx7346 to (Evaluate:01Nov2017)  Requested for: 02Oct2017; Last   Rx:02Oct2017 Ordered   5  Nitrofurantoin Monohyd Macro 100 MG Oral Capsule (Macrobid); Take 1 capsule twice   daily; Therapy: 94AWY5061 to (Evaluate:18Oct2017)  Requested for: 14OQR5102; Last   Rx:11Oct2017 Ordered   6  Potassium Chloride ER 10 MEQ Oral Capsule Extended Release; TAKE 1 CAPSULE   DAILY; Therapy: 30XRB0782 to (Loree Keene)  Requested for: 02Oct2017; Last   Rx:02Oct2017 Ordered   7  Prepopik 10-3 5-12 MG-GM-GM Oral Packet; DILUTE CONTENTS OF PACKET AND USE   AS DIRECTED FOR BOWEL PREP; Therapy: 29YJP4228 to 21 )  Requested for: 52WPK0737; Last   Rx:04Oct2017 Ordered    Allergies    1  No Known Drug Allergies    2  Seasonal    Plan  Ovarian cyst, right    · * US PELVIS COMPLETE (TRANSABDOMINAL AND TRANSVAGINAL); Status:Hold For -  Scheduling,Retrospective By Protocol Authorization;  Requested for:27Nov2017;     Signatures   Electronically signed by : Parish Whittington, ; Oct 16 2017  3:20PM EST                       (Author)

## 2018-01-14 VITALS
WEIGHT: 176.13 LBS | SYSTOLIC BLOOD PRESSURE: 112 MMHG | RESPIRATION RATE: 16 BRPM | BODY MASS INDEX: 33 KG/M2 | DIASTOLIC BLOOD PRESSURE: 82 MMHG | HEART RATE: 68 BPM

## 2018-01-16 NOTE — RESULT NOTES
Discussion/Summary   diabetes test was negative for diabetes   normal kidney and liver function    bad cholesterol LDL is slightly high    watch sweets and fatty food   - Dr Jackelyn Perez      Verified Results  (1) VITAMIN D 25-HYDROXY 03Oct2017 07:38AM Susanne Viramontes Order Number: [id number]     Test Name Result Flag Reference   VIT D 25-HYDROX 34 2 ng/mL  30 0-100 0   This assay is a certified procedure of the CDC Vitamin D Standardization Certification Program (VDSCP)     Deficiency <20ng/ml   Insufficiency 20-30ng/ml   Sufficient  ng/ml     *Patients undergoing fluorescein dye angiography may retain small amounts of fluorescein in the body for 48-72 hours post procedure  Samples containing fluorescein can produce falsely elevated Vitamin D values  If the patient had this procedure, a specimen should be resubmitted post fluorescein clearance  (1) COMPREHENSIVE METABOLIC PANEL 58REM0030 01:29GO Kishor Viramontes Order Number: [id number]     Test Name Result Flag Reference   SODIUM 140 mmol/L  136-145   POTASSIUM 3 5 mmol/L  3 5-5 3   CHLORIDE 104 mmol/L  100-108   CARBON DIOXIDE 29 mmol/L  21-32   ANION GAP (CALC) 7 mmol/L  4-13   BLOOD UREA NITROGEN 11 mg/dL  5-25   CREATININE 0 61 mg/dL  0 60-1 30   Standardized to IDMS reference method   CALCIUM 9 3 mg/dL  8 3-10 1   BILI, TOTAL 0 31 mg/dL  0 20-1 00   ALK PHOSPHATAS 87 U/L     ALT (SGPT) 21 U/L  12-78   Specimen collection should occur prior to Sulfasalazine and/or Sulfapyridine administration due to the potential for falsely depressed results  AST(SGOT) 15 U/L  5-45   Specimen collection should occur prior to Sulfasalazine administration due to the potential for falsely depressed results     ALBUMIN 3 4 g/dL L 3 5-5 0   TOTAL PROTEIN 7 7 g/dL  6 4-8 2   eGFR 112 ml/min/1 73sq m     National Kidney Disease Education Program recommendations are as follows:  GFR calculation is accurate only with a steady state creatinine  Chronic Kidney disease less than 60 ml/min/1 73 sq  meters  Kidney failure less than 15 ml/min/1 73 sq  meters  GLUCOSE FASTING 107 mg/dL H 65-99   Specimen collection should occur prior to Sulfasalazine administration due to the potential for falsely depressed results  Specimen collection should occur prior to Sulfapyridine administration due to the potential for falsely elevated results  (1) MICROALBUMIN CREATININE RATIO, RANDOM URINE 68GKK3715 07:38AM Aisha Viramontes Order Number: [id number]     Test Name Result Flag Reference   MICROALBUMIN/ CREAT R 4 mg/g creatinine  0-30   MICROALBUMIN,URINE 14 8 mg/L  0 0-20 0   CREATININE URINE 355 0 mg/dL       (1) LIPID PANEL, FASTING 44XIN8577 07:38AM Aisha Viramontes Order Number: [id number]     Test Name Result Flag Reference   CHOLESTEROL 194 mg/dL     HDL,DIRECT 49 mg/dL  40-60   Specimen collection should occur prior to Metamizole administration due to the potential for falsley depressed results  LDL CHOLESTEROL CALCULATED 123 mg/dL H 0-100   Triglyceride:        Normal <150 mg/dl   Borderline High 150-199 mg/dl   High 200-499 mg/dl   Very High >499 mg/dl      Cholesterol:       Desirable <200 mg/dl    Borderline High 200-239 mg/dl    High >239 mg/dl      HDL Cholesterol:       High>59 mg/dL    Low <41 mg/dL      This screening LDL is a calculated result  It does not have the accuracy of the Direct Measured LDL in the monitoring of patients with hyperlipidemia and/or statin therapy  Direct Measure LDL (KNW112) must be ordered separately in these patients  TRIGLYCERIDES 112 mg/dL  <=150   Specimen collection should occur prior to N-Acetylcysteine or Metamizole administration due to the potential for falsely depressed results  (1) HEMOGLOBIN A1C 03Oct2017 07:38AM Aisha Viramontes Order Number: [id number]     Test Name Result Flag Reference   HEMOGLOBIN A1C 5 9 %  4 2-6 3   EST  AVG   GLUCOSE 123 mg/dl         Signatures   Electronically signed by : Yovanny Sánchez MD; Oct  3 2017 11:22AM EST                       (Author)

## 2018-01-17 NOTE — RESULT NOTES
Verified Results  * XR ELBOW 3+ VIEW LEFT 63Rdu6531 11:55AM Saúl Viramontes Order Number: [id number]     Test Name Result Flag Reference   XR ELBOW 3+ VW LEFT (Report)     LEFT ELBOW     INDICATION: pt states LT anterior elbow pain x2 Mos with loss of strength     COMPARISON: None     VIEWS: 4; 4 images     FINDINGS:     There is no acute fracture or dislocation  There is no joint effusion  No degenerative changes  No lytic or blastic lesions are seen  Soft tissues are unremarkable  IMPRESSION:     No acute osseous abnormality  Workstation performed: [id number]     Signed by:   Georgina Costello MD   8/30/16       Discussion/Summary   NORMAL LEFT ELBOW  XRAY    - DR VIRAMONTES      Signatures   Electronically signed by : Radha De Anda MD; Aug 30 2016  2:14PM EST                       (Author)

## 2018-01-18 NOTE — PROGRESS NOTES
Assessment    1  Encounter for preventive health examination (V70 0) (Z00 00)   2  Essential hypertension (401 9) (I10)   3  Screening for lipoid disorders (V77 91) (Z13 220)   4  Obesity (278 00) (E66 9)   5  Hypokalemia (276 8) (E87 6)   6  Encounter for screening mammogram for malignant neoplasm of breast (V76 12)   (Z12 31)    Plan  Encounter for screening mammogram for malignant neoplasm of breast    · * MAMMO SCREENING BILATERAL W CAD; Status:Hold For - Scheduling; Requested  for:[id number];   Essential hypertension, Hypokalemia, Screening for lipoid disorders    · (1) COMPREHENSIVE METABOLIC PANEL; Status:Active; Requested for:93Pqq7610;    · (1) LIPID PANEL, FASTING; Status:Active; Requested for:[id number];    · (1) MICROALBUMIN CREATININE RATIO, RANDOM URINE; Status:Active; Requested  for:38Cfw7500;   Obesity    · Saxenda 18 MG/3ML Subcutaneous Solution Pen-injector; use as diirected by your  doctor   · Follow-up visit in 1 month Evaluation and Treatment  Follow-up  Status: Hold For -  Scheduling  Requested for: [id number]    Discussion/Summary  health maintenance visit Currently, she eats an adequate diet and has an inadequate exercise regimen  the risks and benefits of cervical cancer screening were discussed cervical cancer screening is current Breast cancer screening: the risks and benefits of breast cancer screening were discussed and mammogram has been ordered  Colorectal cancer screening: the risks and benefits of colorectal cancer screening were discussed and colorectal cancer screening is not indicated  Osteoporosis screening: the risks and benefits of osteoporosis screening were discussed and bone mineral density testing is not indicated  Screening lab work includes glucose and lipid profile  The immunizations are up to date   She was advised to be evaluated by an ophthalmologist and a dentist  Advice and education were given regarding nutrition, weight loss, calcium supplements, vitamin D supplements, reproductive health, contraception, sunscreen use, self skin examination, helmet use, seat belt use and fall risk reduction  Patient discussion: discussed with the patient, discussed with the patient's family  Chief Complaint  yearly physical      History of Present Illness  HM, Adult Female: The patient is being seen for a health maintenance evaluation  The last health maintenance visit was 2 year(s) ago  General Health: The patient's health since the last visit is described as good  She has regular dental visits  She complains of vision problems  Vision care includes wearing reading glasses and an eye examination within the last year  She denies hearing loss  Immunizations status: up to date  Lifestyle:  She does not have a healthy diet  She does not have any weight concerns  She does not exercise regularly  She does not use tobacco  She consumes alcohol  She reports occasional alcohol use  She denies drug use  Reproductive health:  she reports normal menses  she uses no contraception  she is sexually active  pregnancy history: G 7P 4, 4(miscarriages: 3 )  Screening: cancer screening reviewed and updated  metabolic screening reviewed and updated  risk screening reviewed and updated  Review of Systems    Constitutional: No fever, no chills, feels well, no tiredness, no recent weight gain or weight loss  Eyes: No complaints of eye pain, no red eyes, no eyesight problems, no discharge, no dry eyes, no itching of eyes  ENT: no complaints of earache, no loss of hearing, no nose bleeds, no nasal discharge, no sore throat, no hoarseness  Cardiovascular: No complaints of slow heart rate, no fast heart rate, no chest pain, no palpitations, no leg claudication, no lower extremity edema  Respiratory: No complaints of shortness of breath, no wheezing, no cough, no SOB on exertion, no orthopnea, no PND     Gastrointestinal: No complaints of abdominal pain, no constipation, no nausea or vomiting, no diarrhea, no bloody stools  Genitourinary: No complaints of dysuria, no incontinence, no pelvic pain, no dysmenorrhea, no vaginal discharge or bleeding  Musculoskeletal: No complaints of arthralgias, no myalgias, no joint swelling or stiffness, no limb pain or swelling  Integumentary: No complaints of skin rash or lesions, no itching, no skin wounds, no breast pain or lump  Neurological: No complaints of headache, no confusion, no convulsions, no numbness, no dizziness or fainting, no tingling, no limb weakness, no difficulty walking  Psychiatric: Not suicidal, no sleep disturbance, no anxiety or depression, no change in personality, no emotional problems  Endocrine: No complaints of proptosis, no hot flashes, no muscle weakness, no deepening of the voice, no feelings of weakness  Hematologic/Lymphatic: No complaints of swollen glands, no swollen glands in the neck, does not bleed easily, does not bruise easily  Active Problems    1  Encounter for routine gynecological examination ( 31) (Z01 419)   2  Encounter for screening mammogram for malignant neoplasm of breast (V7 12)   (Z12 31)   3  Essential hypertension (401 9) (I10)   4  Fatigue (780 79) (R53 83)   5  Herniated nucleus pulposus, L4-5 (722 10) (M51 26)   6  Hypokalemia (276 8) (E87 6)   7  Lumbar radiculopathy (724 4) (M54 16)   8  Menometrorrhagia (626 2) (N92 1)   9  Menorrhagia (626 2) (N92 0)   10  Need for prophylactic vaccination and inoculation against influenza (V04 81) (Z23)   11  Obesity (278 00) (E66 9)   12  Pigmented nevus (216 9) (D22 9)   13  Sacroiliitis (720 2) (M46 1)   14  Screening for lipoid disorders (V77 91) (Z13 220)   15  Tuberculosis screening (V74 1) (Z11 1)   16   Vitamin D deficiency (268 9) (E55 9)    Past Medical History    · History of  6 (V22 2) (Z33 1)   · History of hypertension (V12 59) (Z86 79)   · History of migraine (V12 49) (Z86 69)   · History of spontaneous  (V13 29) (Z87 59)   · History of spontaneous  (V13 29) (Z87 59)   · History of Menarche (V21 8)   · History of Normal vaginal delivery (650) (O80)    Surgical History    · History of Appendectomy   · History of Cholecystectomy   · History of Diagnostic Cystoscopy   · History of Endometrial Biopsy By Suction   · History of Hysterectomy   · History of Oral Surgery Tooth Extraction   · History of Salpingectomy   · History of Surgical Treatment Of Missed  In First Trimester    Family History  Mother    · Family history of Diabetes Mellitus (V18 0)   · Family history of Hypertension (V17 49)  Father    · Family history of Colon Cancer (V16 0)   · Family history of colon cancer (V16 0) (Z80 0)   · Family history of depression (V17 0) (Z81 8)   · Family history of hypertension (V17 49) (Z82 49)   · Family history of Heart Disease (V17 49)   · Family history of Prostate Cancer (V16 39)  Sister    · Family history of cardiac disorder (V17 49) (Z82 49)   · Family history of diabetes mellitus (V18 0) (Z83 3)   · Family history of hypertension (V17 49) (Z82 49)  Maternal Grandmother    · Family history of Diabetes Mellitus (V18 0)  Maternal Aunt    · Family history of diabetes mellitus (V18 0) (Z83 3)  Paternal Aunt    · Family history of malignant neoplasm of breast (V16 3) (Z80 3)  Family History    · Family history of Epilepsy   · Family history of Respiratory Disorder    Social History    · Being A Social Drinker   · Birth Control Method - Partner Had Vasectomy   · Currently sexually active   · Daily Coffee Consumption (1  Cups/Day)   · Denied: History of Drug Use   · Lack of exercise (V69 0) (Z72 3)   · Never A Smoker    Current Meds   1  AmLODIPine Besylate 2 5 MG Oral Tablet; TAKE 1 TABLET DAILY AS DIRECTED; Therapy: 58KUI2990 to (Evaluate:2017)  Requested for: 89CHX0536; Last   Rx:[id number] Ordered   2  EQL Vitamin D3 1000 UNIT Oral Tablet; Therapy: (Recorded:2013) to Recorded   3  Lisinopril-Hydrochlorothiazide 20-25 MG Oral Tablet; TAKE 1 TABLET DAILY; Therapy: 23YCQ5080 to (Evaluate:21May2017)  Requested for: 95FWL3087; Last   Rx:[id number] Ordered    Allergies    1  No Known Drug Allergies    2  Seasonal    Vitals   Recorded: 93LSE2573 09:11AM   Heart Rate 60   Respiration 16   Systolic 550   Diastolic 82   Height 5 ft 1 26 in   Weight 173 lb 4 oz   BMI Calculated 32 46   BSA Calculated 1 78     Physical Exam    Constitutional   General appearance: Abnormal   obese  Head and Face   Head and face: Normal     Palpation of the face and sinuses: No sinus tenderness  Eyes   Conjunctiva and lids: No swelling, erythema or discharge  Pupils and irises: Equal, round, reactive to light  Ophthalmoscopic examination: Normal fundi and optic discs  Ears, Nose, Mouth, and Throat   External inspection of ears and nose: Normal     Otoscopic examination: Tympanic membranes translucent with normal light reflex  Canals patent without erythema  Hearing: Normal     Nasal mucosa, septum, and turbinates: Normal without edema or erythema  Lips, teeth, and gums: Normal, good dentition  Oropharynx: Normal with no erythema, edema, exudate or lesions  Neck   Neck: Supple, symmetric, trachea midline, no masses  Thyroid: Normal, no thyromegaly  Pulmonary   Respiratory effort: No increased work of breathing or signs of respiratory distress  Percussion of chest: Normal     Auscultation of lungs: Clear to auscultation  Cardiovascular   Palpation of heart: Normal PMI, no thrills  Auscultation of heart: Normal rate and rhythm, normal S1 and S2, no murmurs  Examination of extremities for edema and/or varicosities: Normal     Chest   Chest: Normal     Abdomen   Abdomen: Non-tender, no masses  Liver and spleen: No hepatomegaly or splenomegaly  Examination for hernias: No hernia appreciated  Lymphatic   Palpation of lymph nodes in neck: No lymphadenopathy      Palpation of lymph nodes in axillae: No lymphadenopathy  Palpation of lymph nodes in groin: No lymphadenopathy  Musculoskeletal   Gait and station: Normal     Digits and nails: Normal without clubbing or cyanosis  Joints, bones, and muscles: Normal     Range of motion: Normal     Stability: Normal     Muscle strength/tone: Normal     Skin   Skin and subcutaneous tissue: Normal without rashes or lesions  Palpation of skin and subcutaneous tissue: Normal turgor  Neurologic   Cranial nerves: Cranial nerves II-XII intact  Cortical function: Normal mental status  Reflexes: 2+ and symmetric  Sensation: No sensory loss  Coordination: Normal finger to nose and heel to shin  Psychiatric   Judgment and insight: Normal     Orientation to person, place, and time: Normal     Recent and remote memory: Intact  Mood and affect: Normal        Results/Data  PHQ-2 Adult Depression Screening [id number] 09:12AM User, Ahs     Test Name Result Flag Reference   PHQ-2 Adult Depression Score 0     Over the last two weeks, how often have you been bothered by any of the following problems?   Little interest or pleasure in doing things: Not at all - 0  Feeling down, depressed, or hopeless: Not at all - 0   PHQ-2 Adult Depression Screening Negative         Signatures   Electronically signed by : Dave Neumann MD; Jul 8 2016  9:38AM EST                       (Author)

## 2018-01-18 NOTE — RESULT NOTES
Message   Recorded as Task   Date: 08/18/2016 08:06 AM, Created By: Deborah Turcios   Task Name: Result Follow Up   Assigned To: Caridad Pool   Regarding Patient: Abena Pelaez, Status: In Progress   Carmen Fitzgerald - 18 Aug 2016 8:06 AM     TASK CREATED  Urine culture with low counts of 2 types of bacteria   Please notify patient that I would recommend a course of antibiotics and have eRx'd this to her pharmacy on file  Please advise pushing fluids and f/u as needed if sx do not improve   [facility] - 18 Aug 2016 8:58 AM     TASK IN PROGRESS   [facility] - 18 Aug 2016 9:02 AM     TASK EDITED   [facility] - 18 Aug 2016 9:31 AM     TASK IN PROGRESS   [facility] - 18 Aug 2016 9:34 AM     TASK EDITED  lmtcb   [facility] - 18 Aug 2016 4:00 PM     TASK EDITED    Pt told she has uti  Will  rx at pharm  Will inc fluids        Signatures   Electronically signed by :  Bharath Colindres, ; Aug 18 2016  4:01PM EST                       (Author)

## 2018-01-22 VITALS
WEIGHT: 176 LBS | BODY MASS INDEX: 32.39 KG/M2 | HEIGHT: 62 IN | DIASTOLIC BLOOD PRESSURE: 90 MMHG | SYSTOLIC BLOOD PRESSURE: 134 MMHG

## 2018-10-06 DIAGNOSIS — Z12.31 ENCOUNTER FOR SCREENING MAMMOGRAM FOR MALIGNANT NEOPLASM OF BREAST: ICD-10-CM

## 2018-10-09 DIAGNOSIS — I10 ESSENTIAL HYPERTENSION: Primary | ICD-10-CM

## 2018-10-09 RX ORDER — AMLODIPINE BESYLATE 2.5 MG/1
TABLET ORAL
Qty: 90 TABLET | Refills: 2 | Status: SHIPPED | OUTPATIENT
Start: 2018-10-09

## 2018-10-09 RX ORDER — LISINOPRIL AND HYDROCHLOROTHIAZIDE 25; 20 MG/1; MG/1
TABLET ORAL
Qty: 90 TABLET | Refills: 2 | Status: SHIPPED | OUTPATIENT
Start: 2018-10-09

## 2025-03-13 NOTE — H&P
History and Physical -  [facility]  Rebecca John 43 y o  female MRN: [id number]                  HPI: Rebecca John is a 43y o  year old female who presents for colonoscopy due to family history of colon cancer  REVIEW OF SYSTEMS: Per the HPI, and otherwise unremarkable  Historical Information   Past Medical History:   Diagnosis Date    Hypertension     PONV (postoperative nausea and vomiting)      Past Surgical History:   Procedure Laterality Date    ABDOMINOPLASTY      APPENDECTOMY      BUNIONECTOMY Bilateral     CHOLECYSTECTOMY      HYSTERECTOMY       Social History   History   Alcohol Use    Yes     Comment: social     History   Drug Use No     History   Smoking Status    Never Smoker   Smokeless Tobacco    Never Used     History reviewed  No pertinent family history  Meds/Allergies     Prescriptions Prior to Admission   Medication    amLODIPine (NORVASC) 2 5 mg tablet    cholecalciferol (VITAMIN D3) 1,000 units tablet    lisinopril-hydrochlorothiazide (PRINZIDE,ZESTORETIC) 20-25 MG per tablet    potassium chloride (K-DUR) 10 mEq tablet       No Known Allergies    Objective     Blood pressure 99/60, pulse 63, temperature (!) 97 2 °F (36 2 °C), temperature source Temporal, resp  rate 18, height 5' 1" (1 549 m), weight 79 4 kg (175 lb), SpO2 97 %  PHYSICAL EXAM    Gen: NAD  CV: RRR  CHEST: Clear  ABD: soft, NT/ND  EXT: no edema  Neuro: AAO      ASSESSMENT/PLAN:  This is a 43y o  year old female here for colonoscopy due to family history of colon cancer      PLAN: colonoscopy DM See above    T(C): 36.8 (03-12-25 @ 22:45)  T(F): 98.3 (03-12-25 @ 22:45), Max: 98.3 (03-12-25 @ 22:45)  HR: 99 (03-12-25 @ 22:45) (82 - 99)  BP: 124/81 (03-12-25 @ 22:45) (124/81 - 143/85)  RR:  (16 - 18)  SpO2:  (97% - 98%)  Wt(kg): -- see HPI